# Patient Record
Sex: FEMALE | Race: WHITE | NOT HISPANIC OR LATINO
[De-identification: names, ages, dates, MRNs, and addresses within clinical notes are randomized per-mention and may not be internally consistent; named-entity substitution may affect disease eponyms.]

---

## 2021-04-27 ENCOUNTER — TRANSCRIPTION ENCOUNTER (OUTPATIENT)
Age: 26
End: 2021-04-27

## 2022-10-05 ENCOUNTER — TRANSCRIPTION ENCOUNTER (OUTPATIENT)
Age: 27
End: 2022-10-05

## 2022-10-06 ENCOUNTER — EMERGENCY (EMERGENCY)
Facility: HOSPITAL | Age: 27
LOS: 1 days | Discharge: HOME | End: 2022-10-06
Attending: STUDENT IN AN ORGANIZED HEALTH CARE EDUCATION/TRAINING PROGRAM | Admitting: STUDENT IN AN ORGANIZED HEALTH CARE EDUCATION/TRAINING PROGRAM
Payer: COMMERCIAL

## 2022-10-06 VITALS
HEART RATE: 95 BPM | DIASTOLIC BLOOD PRESSURE: 73 MMHG | OXYGEN SATURATION: 97 % | TEMPERATURE: 98 F | SYSTOLIC BLOOD PRESSURE: 107 MMHG | RESPIRATION RATE: 14 BRPM

## 2022-10-06 VITALS
TEMPERATURE: 98 F | WEIGHT: 111.99 LBS | HEART RATE: 107 BPM | RESPIRATION RATE: 18 BRPM | OXYGEN SATURATION: 98 % | SYSTOLIC BLOOD PRESSURE: 118 MMHG | DIASTOLIC BLOOD PRESSURE: 80 MMHG

## 2022-10-06 PROCEDURE — 70450 CT HEAD/BRAIN W/O DYE: CPT | Mod: 26,MA

## 2022-10-06 PROCEDURE — 12051 INTMD RPR FACE/MM 2.5 CM/<: CPT

## 2022-10-06 PROCEDURE — 99284 EMERGENCY DEPT VISIT MOD MDM: CPT | Mod: 25

## 2022-10-06 PROCEDURE — 99284 EMERGENCY DEPT VISIT MOD MDM: CPT

## 2022-10-06 PROCEDURE — 70450 CT HEAD/BRAIN W/O DYE: CPT | Mod: MA

## 2022-10-06 RX ORDER — ACETAMINOPHEN 500 MG
1000 TABLET ORAL ONCE
Refills: 0 | Status: COMPLETED | OUTPATIENT
Start: 2022-10-06 | End: 2022-10-06

## 2022-10-06 RX ADMIN — Medication 1000 MILLIGRAM(S): at 22:12

## 2022-10-06 RX ADMIN — Medication 1000 MILLIGRAM(S): at 19:20

## 2022-10-06 NOTE — ED PROVIDER NOTE - CLINICAL SUMMARY MEDICAL DECISION MAKING FREE TEXT BOX
I, Lupillo Gandara MD, have seen and examined the patient on the date of service.  I agree with the ALYSSIA's assessment as written, with exceptions or additions as noted below or in a separate note. pt here after slip and fall while hiking. sustained laceration to forehead. not on AC. believes UTD with tdap. denies other complaints. has laceration to left side of forehead. no other injury seen. a/p: has forehead laceration without other evidence of trauma. pt worried for head injury, requesting ct head. do not suspect c spine injury. pt spoke to plastics prior to arrival for wound repair.

## 2022-10-06 NOTE — ED PROVIDER NOTE - NSFOLLOWUPINSTRUCTIONS_ED_ALL_ED_FT
Facial Laceration      A facial laceration is a cut (laceration) on the face. It is caused by any injury that cuts or tears the skin or tissues on the face. Facial lacerations can bleed and be painful.    You may need medical attention to stop the bleeding, help the wound heal, lower your risk of infection, and prevent scarring. Lacerations usually heal quickly after treatment.      What are the causes?    This condition may be caused by:  •A motor vehicle crash.      •A sports injury.      •A violent attack.      •A fall.        What are the signs or symptoms?    Common symptoms of this condition include:  •An obvious cut on the face.      •Bleeding.      •Pain.      •Swelling.      •Bruising.      •A change in the appearance of the face.        How is this diagnosed?    Your health care provider can diagnose a facial laceration by doing a physical exam and asking how the injury happened. Your health care provider may also check for areas of bleeding, tissue damage, nerve injury, and objects (foreign bodies) in your wound.      How is this treated?    Treatment for a facial laceration depends on how severe and deep the wound is. It also depends on the risk for infection. First, your health care provider will clean the wound to prevent infection. Then, your health care provider will decide whether to close the wound. This depends on how deep the laceration is and how long ago your injury happened. If there is an increased risk of infection, the wound will not be closed.•If your wound needs to be closed:  •Your health care provider will use stitches (sutures), skin glue (skin adhesive), or skin adhesive strips to repair the laceration.      •Your health care provider may numb the area around your wound by injecting a numbing medicine in and around your laceration before doing the sutures.      •Torn skin edges or dead skin may be removed.      •If sutures are used, the laceration may be closed in layers. Absorbable sutures will be used for deep tissues and muscle. Removable sutures will be used to close the skin.      •You may be given:  •Pain medicine.      •A tetanus shot.      •Oral antibiotic medicines.      •Antibiotic ointment.          Follow these instructions at home:    Wound care     Follow instructions from your health care provider about how to take care of your wound. Make sure you:  •Wash your hands with soap and water for at least 20 seconds before and after you change your bandage (dressing). If soap and water are not available, use hand .      •Change your dressing as told by your health care provider.      •Leave sutures, skin adhesive, or adhesive strips in place. These skin closures may need to stay in place for 2 weeks or longer. If adhesive strip edges start to loosen and curl up, you may trim the loose edges. Do not remove adhesive strips completely unless your health care provider tells you to do that.      These instructions will vary depending on how the wound was closed.      For sutures:    •Keep the wound clean and dry.      •If you were given a dressing, change it at least once a day, or as told by your health care provider. Also change the dressing if it gets wet or dirty.      •Wash the wound with soap and water two times a day, or as told by your health care provider. Rinse off the soap with water. Pat the wound dry with a clean towel.      •After cleaning, apply a thin layer of antibiotic ointment as told by your health care provider. This helps prevent infection and keeps the dressing from sticking to the wound.      •You may shower as usual after the first 24 hours. Do not soak the wound until the sutures are removed.      •Return to have your sutures removed as told by your health care provider.      •Do not wear makeup in the area of the wound until your health care provider has approved.        For skin adhesive:    •You may briefly wet your wound in the shower or bath.      •Do not soak or scrub the wound.      •Do not swim.      •Do not sweat heavily until the skin adhesive has fallen off on its own.      •After showering or bathing, gently pat the wound dry with a clean towel.      •Do not apply liquid medicine, cream medicine, ointment, or makeup to your wound while the skin adhesive is in place. This may loosen the film before your wound is healed.      •If you have a dressing over your wound, be careful not to apply tape directly over the skin adhesive. This may pull off the adhesive before the wound is healed.      •Do not spend a long time in the sun or use a tanning lamp while the skin adhesive is in place.      •The skin adhesive will usually remain in place for 5–10 days and then naturally fall off the skin. Do not pick at the adhesive film.        For skin adhesive strips:    •Keep the wound clean and dry.      •Do not let the skin adhesive strips get wet.      •Bathe carefully to keep the wound and adhesive strips dry. If the wound gets wet, pat it dry with a clean towel right away.      •Skin adhesive strips fall off on their own over time. You may trim the strips as the wound heals. Do not remove skin adhesive strips that are still stuck to the wound.      General instructions  •Check your wound area every day for signs of infection. Check for:  •More redness, swelling, or pain.      •Fluid or blood.      •Warmth.      •Pus or a bad smell.        •Take over-the-counter and prescription medicines only as told by your health care provider.      •If you were prescribed an antibiotic medicine, take it or apply it as told by your health care provider. Do not stop using the antibiotic even if you start to feel better.    •After the laceration has healed:  •Know that it can take a year or two for redness or scarring to fade.      •Apply sunscreen to the skin of your healed wound to minimize scarring. Ultraviolet (UV) rays can darken scar tissue.          Contact a health care provider if:    •You have a fever. A fever is a body temperature that is 100.4°F (38°C) or higher.      •You have more redness, swelling, or pain around your wound.      •You have fluid or blood coming from your wound.      •Your wound feels warm to the touch.      •You have pus or a bad smell coming from your wound.        Get help right away if:    •You have a red streak going away from your wound.        Summary    •You may need treatment for a facial laceration to prevent infection, stop bleeding, help healing, and prevent scarring.      •A deep laceration may be closed with stitches (sutures).      •Follow your health care provider's wound care instructions carefully.      This information is not intended to replace advice given to you by your health care provider. Make sure you discuss any questions you have with your health care provider.

## 2022-10-06 NOTE — ED PROVIDER NOTE - PATIENT PORTAL LINK FT
You can access the FollowMyHealth Patient Portal offered by [facility] by registering at the following website: [email or web address]. By joining [employer] [facility]’s FollowMyHealth portal, you will also be able to view your health information using other applications (apps) compatible with our system.

## 2022-10-06 NOTE — ED ADULT NURSE NOTE - OBJECTIVE STATEMENT
c/o left upper eyebrow laceration,s/p tripped and fall while hiking and fell onto a boulder,denies LOC

## 2022-10-06 NOTE — ED PROVIDER NOTE - CARE PROVIDER_API CALL
ShikowitzBehr, Lauren B (MD)  Plastic Surgery  [address] [place] [address]  Phone: [phone]  Fax: [phone]  Follow Up Time: 7-10 Days

## 2022-10-06 NOTE — ED PROVIDER NOTE - ENMT, MLM
Lac with localized swelling and tenderness by left eyebrow otherwise no evidence of trauma, nose/cheek/jaw unremarkable.

## 2022-10-06 NOTE — ED PROVIDER NOTE - OBJECTIVE STATEMENT
27 F presents with left facial lac after trip and fall while hiking in [place]. No LOC, no blood thinners. Requesting plastics. No visual changes, neck pain, back pain. Feels lightheaded when standing.

## 2022-10-06 NOTE — ED ADULT NURSE REASSESSMENT NOTE - NS ED NURSE REASSESS COMMENT FT1
Pt received lying on stretcher in NAD, noted with 2 cm laceration to left upper eyelid, no active bleeding. Pt denies LOC, but c/o dizziness upon standing.

## 2022-10-11 PROBLEM — Z78.9 OTHER SPECIFIED HEALTH STATUS: Chronic | Status: ACTIVE | Noted: 2022-10-06

## 2022-10-13 PROBLEM — Z00.00 ENCOUNTER FOR PREVENTIVE HEALTH EXAMINATION: Status: ACTIVE | Noted: 2022-10-13

## 2022-10-14 ENCOUNTER — APPOINTMENT (OUTPATIENT)
Dept: PLASTIC SURGERY | Facility: CLINIC | Age: 27
End: 2022-10-14

## 2022-10-14 DIAGNOSIS — S01.119A LACERATION W/OUT FOREIGN BODY OF UNSPECIFIED EYELID AND PERIOCULAR AREA, INITIAL ENCOUNTER: ICD-10-CM

## 2022-10-14 PROCEDURE — 99024 POSTOP FOLLOW-UP VISIT: CPT

## 2022-10-16 ENCOUNTER — NON-APPOINTMENT (OUTPATIENT)
Age: 27
End: 2022-10-16

## 2022-10-22 PROBLEM — S01.119A EYEBROW LACERATION: Status: ACTIVE | Noted: 2022-10-22

## 2022-10-22 NOTE — HISTORY OF PRESENT ILLNESS
[FreeTextEntry1] : Lisbeth Muñiz is a 28 y/o female s/p layered closure of left eyebrow laceration on 10/6/22. Patient has no complaints today. She feels well and denies any pain at site.

## 2022-10-22 NOTE — PHYSICAL EXAM
[de-identified] : NAD< AxOx3  [de-identified] : Left eyebrow- sutures in place. Site is clean, dry and intact. Healing well. There is no evidence of bleeding, infection or skin breakdown.  [de-identified] : nonlabored breathing  [de-identified] : no edema  [de-identified] : as above  [de-identified] : normal affect